# Patient Record
Sex: MALE | Race: WHITE | Employment: UNEMPLOYED | URBAN - METROPOLITAN AREA
[De-identification: names, ages, dates, MRNs, and addresses within clinical notes are randomized per-mention and may not be internally consistent; named-entity substitution may affect disease eponyms.]

---

## 2019-11-11 ENCOUNTER — HOSPITAL ENCOUNTER (OUTPATIENT)
Dept: CT IMAGING | Age: 49
Discharge: HOME | End: 2019-11-11
Attending: NURSE PRACTITIONER

## 2019-11-11 DIAGNOSIS — C64.9 RENAL CELL CARCINOMA, UNSPECIFIED LATERALITY (HCC): ICD-10-CM

## 2019-11-11 DIAGNOSIS — N20.0 KIDNEY STONE: ICD-10-CM

## 2019-11-11 DIAGNOSIS — R31.0 GROSS HEMATURIA: ICD-10-CM

## 2019-11-11 RX ORDER — SODIUM CHLORIDE 0.9 % (FLUSH) 0.9 %
10 SYRINGE (ML) INJECTION
Status: DISCONTINUED | OUTPATIENT
Start: 2019-11-11 | End: 2019-11-11

## 2019-11-11 RX ORDER — SODIUM CHLORIDE 0.9 % (FLUSH) 0.9 %
10 SYRINGE (ML) INJECTION
Status: COMPLETED | OUTPATIENT
Start: 2019-11-11 | End: 2019-11-11

## 2019-11-11 RX ADMIN — Medication 10 ML: at 14:29

## 2019-11-12 NOTE — PROGRESS NOTES
Good news. No signs of tumor/mass! There are non-obstructing renal stones that do not appear to be source of the hematuria. I recommend cystoscopy in office for further evaluation. Please see which physician he prefers and schedule him.